# Patient Record
Sex: FEMALE | Race: WHITE | NOT HISPANIC OR LATINO | URBAN - METROPOLITAN AREA
[De-identification: names, ages, dates, MRNs, and addresses within clinical notes are randomized per-mention and may not be internally consistent; named-entity substitution may affect disease eponyms.]

---
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
## 2023-11-10 PROCEDURE — 87081 CULTURE SCREEN ONLY: CPT | Performed by: NURSE PRACTITIONER

## 2023-11-12 ENCOUNTER — TELEPHONE (OUTPATIENT)
Dept: URGENT CARE | Facility: CLINIC | Age: 18
End: 2023-11-12
Payer: COMMERCIAL

## 2023-11-14 ENCOUNTER — TELEPHONE (OUTPATIENT)
Dept: URGENT CARE | Facility: CLINIC | Age: 18
End: 2023-11-14

## 2023-11-15 ENCOUNTER — HOSPITAL ENCOUNTER (EMERGENCY)
Facility: HOSPITAL | Age: 18
Discharge: HOME | End: 2023-11-15
Attending: STUDENT IN AN ORGANIZED HEALTH CARE EDUCATION/TRAINING PROGRAM | Admitting: STUDENT IN AN ORGANIZED HEALTH CARE EDUCATION/TRAINING PROGRAM
Payer: COMMERCIAL

## 2023-11-15 ENCOUNTER — APPOINTMENT (OUTPATIENT)
Dept: CT IMAGING | Facility: HOSPITAL | Age: 18
End: 2023-11-15
Payer: COMMERCIAL

## 2023-11-15 VITALS
RESPIRATION RATE: 16 BRPM | OXYGEN SATURATION: 99 % | WEIGHT: 117 LBS | SYSTOLIC BLOOD PRESSURE: 126 MMHG | HEIGHT: 63 IN | DIASTOLIC BLOOD PRESSURE: 111 MMHG | HEART RATE: 118 BPM | TEMPERATURE: 98.1 F | BODY MASS INDEX: 20.73 KG/M2

## 2023-11-15 DIAGNOSIS — J36 PERITONSILLAR ABSCESS: Primary | ICD-10-CM

## 2023-11-15 LAB
ALBUMIN SERPL-MCNC: 4.7 G/DL (ref 3.5–5.2)
ALBUMIN/GLOB SERPL: 1.3 G/DL
ALP SERPL-CCNC: 80 U/L (ref 39–117)
ALT SERPL W P-5'-P-CCNC: 28 U/L (ref 1–33)
ANION GAP SERPL CALCULATED.3IONS-SCNC: 15 MMOL/L (ref 5–15)
AST SERPL-CCNC: 36 U/L (ref 1–32)
B-HCG UR QL: NEGATIVE
BASOPHILS # BLD AUTO: 0.07 10*3/MM3 (ref 0–0.2)
BASOPHILS NFR BLD AUTO: 0.4 % (ref 0–1.5)
BILIRUB SERPL-MCNC: 0.5 MG/DL (ref 0–1.2)
BUN SERPL-MCNC: 12 MG/DL (ref 6–20)
BUN/CREAT SERPL: 16 (ref 7–25)
CALCIUM SPEC-SCNC: 9.9 MG/DL (ref 8.6–10.5)
CHLORIDE SERPL-SCNC: 98 MMOL/L (ref 98–107)
CO2 SERPL-SCNC: 25 MMOL/L (ref 22–29)
CREAT SERPL-MCNC: 0.75 MG/DL (ref 0.57–1)
DEPRECATED RDW RBC AUTO: 42.3 FL (ref 37–54)
EGFRCR SERPLBLD CKD-EPI 2021: 117.1 ML/MIN/1.73
EOSINOPHIL # BLD AUTO: 0.01 10*3/MM3 (ref 0–0.4)
EOSINOPHIL NFR BLD AUTO: 0.1 % (ref 0.3–6.2)
ERYTHROCYTE [DISTWIDTH] IN BLOOD BY AUTOMATED COUNT: 11.9 % (ref 12.3–15.4)
EXPIRATION DATE: NORMAL
GLOBULIN UR ELPH-MCNC: 3.7 GM/DL
GLUCOSE SERPL-MCNC: 83 MG/DL (ref 65–99)
HCT VFR BLD AUTO: 47.6 % (ref 34–46.6)
HGB BLD-MCNC: 16 G/DL (ref 12–15.9)
IMM GRANULOCYTES # BLD AUTO: 0.27 10*3/MM3 (ref 0–0.05)
IMM GRANULOCYTES NFR BLD AUTO: 1.5 % (ref 0–0.5)
INTERNAL NEGATIVE CONTROL: NEGATIVE
INTERNAL POSITIVE CONTROL: POSITIVE
LYMPHOCYTES # BLD AUTO: 1.68 10*3/MM3 (ref 0.7–3.1)
LYMPHOCYTES NFR BLD AUTO: 9.4 % (ref 19.6–45.3)
Lab: NORMAL
MCH RBC QN AUTO: 32.5 PG (ref 26.6–33)
MCHC RBC AUTO-ENTMCNC: 33.6 G/DL (ref 31.5–35.7)
MCV RBC AUTO: 96.7 FL (ref 79–97)
MONOCYTES # BLD AUTO: 1.67 10*3/MM3 (ref 0.1–0.9)
MONOCYTES NFR BLD AUTO: 9.4 % (ref 5–12)
NEUTROPHILS NFR BLD AUTO: 14.09 10*3/MM3 (ref 1.7–7)
NEUTROPHILS NFR BLD AUTO: 79.2 % (ref 42.7–76)
NRBC BLD AUTO-RTO: 0 /100 WBC (ref 0–0.2)
PLATELET # BLD AUTO: 296 10*3/MM3 (ref 140–450)
PMV BLD AUTO: 9.8 FL (ref 6–12)
POTASSIUM SERPL-SCNC: 3.5 MMOL/L (ref 3.5–5.2)
PROT SERPL-MCNC: 8.4 G/DL (ref 6–8.5)
RBC # BLD AUTO: 4.92 10*6/MM3 (ref 3.77–5.28)
SODIUM SERPL-SCNC: 138 MMOL/L (ref 136–145)
WBC NRBC COR # BLD: 17.79 10*3/MM3 (ref 3.4–10.8)

## 2023-11-15 PROCEDURE — 99285 EMERGENCY DEPT VISIT HI MDM: CPT

## 2023-11-15 PROCEDURE — 81025 URINE PREGNANCY TEST: CPT | Performed by: PHYSICIAN ASSISTANT

## 2023-11-15 PROCEDURE — 25510000001 IOPAMIDOL 61 % SOLUTION: Performed by: STUDENT IN AN ORGANIZED HEALTH CARE EDUCATION/TRAINING PROGRAM

## 2023-11-15 PROCEDURE — 25010000002 DEXAMETHASONE SODIUM PHOSPHATE 100 MG/10ML SOLUTION: Performed by: PHYSICIAN ASSISTANT

## 2023-11-15 PROCEDURE — 25010000002 AMPICILLIN-SULBACTAM PER 1.5 G: Performed by: PHYSICIAN ASSISTANT

## 2023-11-15 PROCEDURE — 96375 TX/PRO/DX INJ NEW DRUG ADDON: CPT

## 2023-11-15 PROCEDURE — 70491 CT SOFT TISSUE NECK W/DYE: CPT

## 2023-11-15 PROCEDURE — 25010000002 KETOROLAC TROMETHAMINE PER 15 MG: Performed by: PHYSICIAN ASSISTANT

## 2023-11-15 PROCEDURE — 96365 THER/PROPH/DIAG IV INF INIT: CPT

## 2023-11-15 PROCEDURE — 85025 COMPLETE CBC W/AUTO DIFF WBC: CPT | Performed by: PHYSICIAN ASSISTANT

## 2023-11-15 PROCEDURE — 25810000003 SODIUM CHLORIDE 0.9 % SOLUTION: Performed by: PHYSICIAN ASSISTANT

## 2023-11-15 PROCEDURE — 96367 TX/PROPH/DG ADDL SEQ IV INF: CPT

## 2023-11-15 PROCEDURE — 80053 COMPREHEN METABOLIC PANEL: CPT | Performed by: PHYSICIAN ASSISTANT

## 2023-11-15 PROCEDURE — 96366 THER/PROPH/DIAG IV INF ADDON: CPT

## 2023-11-15 RX ORDER — KETOROLAC TROMETHAMINE 30 MG/ML
15 INJECTION, SOLUTION INTRAMUSCULAR; INTRAVENOUS ONCE
Status: COMPLETED | OUTPATIENT
Start: 2023-11-15 | End: 2023-11-15

## 2023-11-15 RX ORDER — SODIUM CHLORIDE 0.9 % (FLUSH) 0.9 %
10 SYRINGE (ML) INJECTION AS NEEDED
Status: DISCONTINUED | OUTPATIENT
Start: 2023-11-15 | End: 2023-11-15 | Stop reason: HOSPADM

## 2023-11-15 RX ADMIN — DEXAMETHASONE SODIUM PHOSPHATE 10 MG: 10 INJECTION, SOLUTION INTRAMUSCULAR; INTRAVENOUS at 11:42

## 2023-11-15 RX ADMIN — KETOROLAC TROMETHAMINE 15 MG: 30 INJECTION, SOLUTION INTRAMUSCULAR; INTRAVENOUS at 11:42

## 2023-11-15 RX ADMIN — SODIUM CHLORIDE 1000 ML: 9 INJECTION, SOLUTION INTRAVENOUS at 11:42

## 2023-11-15 RX ADMIN — IOPAMIDOL 74 ML: 612 INJECTION, SOLUTION INTRAVENOUS at 11:53

## 2023-11-15 RX ADMIN — SODIUM CHLORIDE 1.5 G: 900 INJECTION INTRAVENOUS at 13:13

## 2023-12-06 ENCOUNTER — OFFICE VISIT (OUTPATIENT)
Dept: FAMILY MEDICINE CLINIC | Facility: CLINIC | Age: 18
End: 2023-12-06
Payer: COMMERCIAL

## 2023-12-06 VITALS
SYSTOLIC BLOOD PRESSURE: 141 MMHG | OXYGEN SATURATION: 100 % | DIASTOLIC BLOOD PRESSURE: 88 MMHG | HEIGHT: 63 IN | BODY MASS INDEX: 21.9 KG/M2 | WEIGHT: 123.6 LBS | HEART RATE: 110 BPM | TEMPERATURE: 98.2 F

## 2023-12-06 DIAGNOSIS — Z79.899 ENCOUNTER FOR LONG-TERM (CURRENT) USE OF HIGH-RISK MEDICATION: ICD-10-CM

## 2023-12-06 DIAGNOSIS — F98.8 ATTENTION DEFICIT DISORDER (ADD) WITHOUT HYPERACTIVITY: Primary | ICD-10-CM

## 2023-12-06 PROCEDURE — 80305 DRUG TEST PRSMV DIR OPT OBS: CPT | Performed by: NURSE PRACTITIONER

## 2023-12-06 PROCEDURE — 99203 OFFICE O/P NEW LOW 30 MIN: CPT | Performed by: NURSE PRACTITIONER

## 2023-12-06 RX ORDER — METHYLPHENIDATE HYDROCHLORIDE 54 MG/1
54 TABLET ORAL EVERY MORNING
Qty: 30 TABLET | Refills: 0 | Status: SHIPPED | OUTPATIENT
Start: 2023-12-06

## 2023-12-06 RX ORDER — AMOXICILLIN AND CLAVULANATE POTASSIUM 600; 42.9 MG/5ML; MG/5ML
POWDER, FOR SUSPENSION ORAL
COMMUNITY

## 2024-01-08 RX ORDER — METHYLPHENIDATE HYDROCHLORIDE 54 MG/1
54 TABLET ORAL EVERY MORNING
Qty: 30 TABLET | Refills: 0 | Status: SHIPPED | OUTPATIENT
Start: 2024-01-08

## 2024-02-07 RX ORDER — METHYLPHENIDATE HYDROCHLORIDE 54 MG/1
54 TABLET ORAL EVERY MORNING
Qty: 30 TABLET | Refills: 0 | Status: SHIPPED | OUTPATIENT
Start: 2024-02-07

## 2024-03-06 ENCOUNTER — OFFICE VISIT (OUTPATIENT)
Dept: FAMILY MEDICINE CLINIC | Facility: CLINIC | Age: 19
End: 2024-03-06
Payer: COMMERCIAL

## 2024-03-06 VITALS
WEIGHT: 123 LBS | OXYGEN SATURATION: 100 % | TEMPERATURE: 98.6 F | HEIGHT: 63 IN | BODY MASS INDEX: 21.79 KG/M2 | SYSTOLIC BLOOD PRESSURE: 122 MMHG | HEART RATE: 99 BPM | DIASTOLIC BLOOD PRESSURE: 69 MMHG

## 2024-03-06 DIAGNOSIS — F98.8 ATTENTION DEFICIT DISORDER (ADD) WITHOUT HYPERACTIVITY: Primary | ICD-10-CM

## 2024-03-06 DIAGNOSIS — J02.8 PHARYNGITIS DUE TO OTHER ORGANISM: ICD-10-CM

## 2024-03-06 PROCEDURE — 99214 OFFICE O/P EST MOD 30 MIN: CPT | Performed by: NURSE PRACTITIONER

## 2024-03-06 RX ORDER — AMOXICILLIN AND CLAVULANATE POTASSIUM 875; 125 MG/1; MG/1
1 TABLET, FILM COATED ORAL 2 TIMES DAILY
Qty: 20 TABLET | Refills: 0 | Status: SHIPPED | OUTPATIENT
Start: 2024-03-06 | End: 2024-03-16

## 2024-03-06 RX ORDER — METHYLPHENIDATE HYDROCHLORIDE 54 MG/1
54 TABLET ORAL EVERY MORNING
Qty: 30 TABLET | Refills: 0 | Status: SHIPPED | OUTPATIENT
Start: 2024-03-06

## 2024-04-06 DIAGNOSIS — F98.8 ATTENTION DEFICIT DISORDER (ADD) WITHOUT HYPERACTIVITY: ICD-10-CM

## 2024-04-08 RX ORDER — METHYLPHENIDATE HYDROCHLORIDE 54 MG/1
54 TABLET ORAL EVERY MORNING
Qty: 30 TABLET | Refills: 0 | Status: SHIPPED | OUTPATIENT
Start: 2024-04-08

## 2024-05-06 DIAGNOSIS — F98.8 ATTENTION DEFICIT DISORDER (ADD) WITHOUT HYPERACTIVITY: ICD-10-CM

## 2024-05-06 RX ORDER — METHYLPHENIDATE HYDROCHLORIDE 54 MG/1
54 TABLET ORAL EVERY MORNING
Qty: 30 TABLET | Refills: 0 | Status: SHIPPED | OUTPATIENT
Start: 2024-05-06

## 2024-06-04 DIAGNOSIS — F98.8 ATTENTION DEFICIT DISORDER (ADD) WITHOUT HYPERACTIVITY: ICD-10-CM

## 2024-06-04 RX ORDER — METHYLPHENIDATE HYDROCHLORIDE 54 MG/1
54 TABLET ORAL EVERY MORNING
Qty: 30 TABLET | Refills: 0 | Status: SHIPPED | OUTPATIENT
Start: 2024-06-04

## 2024-06-10 ENCOUNTER — OFFICE VISIT (OUTPATIENT)
Dept: FAMILY MEDICINE CLINIC | Facility: CLINIC | Age: 19
End: 2024-06-10
Payer: COMMERCIAL

## 2024-06-10 VITALS
DIASTOLIC BLOOD PRESSURE: 71 MMHG | WEIGHT: 115 LBS | HEART RATE: 101 BPM | HEIGHT: 63 IN | BODY MASS INDEX: 20.38 KG/M2 | TEMPERATURE: 98.7 F | SYSTOLIC BLOOD PRESSURE: 108 MMHG | OXYGEN SATURATION: 100 %

## 2024-06-10 DIAGNOSIS — F98.8 ATTENTION DEFICIT DISORDER (ADD) WITHOUT HYPERACTIVITY: Primary | ICD-10-CM

## 2024-06-10 DIAGNOSIS — B35.4 RINGWORM OF BODY: ICD-10-CM

## 2024-06-10 PROCEDURE — 99213 OFFICE O/P EST LOW 20 MIN: CPT | Performed by: NURSE PRACTITIONER

## 2024-06-10 RX ORDER — CLOTRIMAZOLE 1 %
1 CREAM (GRAM) TOPICAL 2 TIMES DAILY
Qty: 60 G | Refills: 0 | Status: SHIPPED | OUTPATIENT
Start: 2024-06-10

## 2024-07-07 DIAGNOSIS — F98.8 ATTENTION DEFICIT DISORDER (ADD) WITHOUT HYPERACTIVITY: ICD-10-CM

## 2024-07-08 RX ORDER — METHYLPHENIDATE HYDROCHLORIDE 54 MG/1
54 TABLET ORAL EVERY MORNING
Qty: 30 TABLET | Refills: 0 | Status: SHIPPED | OUTPATIENT
Start: 2024-07-08

## 2024-08-06 DIAGNOSIS — F98.8 ATTENTION DEFICIT DISORDER (ADD) WITHOUT HYPERACTIVITY: ICD-10-CM

## 2024-08-06 RX ORDER — METHYLPHENIDATE HYDROCHLORIDE 54 MG/1
54 TABLET ORAL EVERY MORNING
Qty: 30 TABLET | Refills: 0 | Status: SHIPPED | OUTPATIENT
Start: 2024-08-06

## 2024-09-10 ENCOUNTER — OFFICE VISIT (OUTPATIENT)
Dept: FAMILY MEDICINE CLINIC | Facility: CLINIC | Age: 19
End: 2024-09-10
Payer: COMMERCIAL

## 2024-09-10 VITALS
WEIGHT: 114 LBS | OXYGEN SATURATION: 100 % | BODY MASS INDEX: 20.2 KG/M2 | SYSTOLIC BLOOD PRESSURE: 102 MMHG | HEIGHT: 63 IN | HEART RATE: 127 BPM | DIASTOLIC BLOOD PRESSURE: 68 MMHG

## 2024-09-10 DIAGNOSIS — F98.8 ATTENTION DEFICIT DISORDER (ADD) WITHOUT HYPERACTIVITY: Primary | ICD-10-CM

## 2024-09-10 DIAGNOSIS — Z79.899 ENCOUNTER FOR LONG-TERM (CURRENT) USE OF HIGH-RISK MEDICATION: ICD-10-CM

## 2024-09-10 DIAGNOSIS — F98.8 ATTENTION DEFICIT DISORDER (ADD) WITHOUT HYPERACTIVITY: ICD-10-CM

## 2024-09-10 LAB
AMPHET+METHAMPHET UR QL: NEGATIVE
AMPHETAMINE INTERNAL CONTROL: NORMAL
AMPHETAMINES UR QL: NEGATIVE
BARBITURATE INTERNAL CONTROL: NORMAL
BARBITURATES UR QL SCN: NEGATIVE
BENZODIAZ UR QL SCN: NEGATIVE
BENZODIAZEPINE INTERNAL CONTROL: NORMAL
BUPRENORPHINE INTERNAL CONTROL: NORMAL
BUPRENORPHINE SERPL-MCNC: NEGATIVE NG/ML
CANNABINOIDS SERPL QL: NEGATIVE
COCAINE INTERNAL CONTROL: NORMAL
COCAINE UR QL: NEGATIVE
EXPIRATION DATE: NORMAL
Lab: NORMAL
MDMA (ECSTASY) INTERNAL CONTROL: NORMAL
MDMA UR QL SCN: NEGATIVE
METHADONE INTERNAL CONTROL: NORMAL
METHADONE UR QL SCN: NEGATIVE
METHAMPHETAMINE INTERNAL CONTROL: NORMAL
MORPHINE INTERNAL CONTROL: NORMAL
MORPHINE/OPIATES SCREEN, URINE: NEGATIVE
OXYCODONE INTERNAL CONTROL: NORMAL
OXYCODONE UR QL SCN: NEGATIVE
PCP UR QL SCN: NEGATIVE
PHENCYCLIDINE INTERNAL CONTROL: NORMAL
THC INTERNAL CONTROL: NORMAL

## 2024-09-10 PROCEDURE — 80305 DRUG TEST PRSMV DIR OPT OBS: CPT | Performed by: NURSE PRACTITIONER

## 2024-09-10 PROCEDURE — 99213 OFFICE O/P EST LOW 20 MIN: CPT | Performed by: NURSE PRACTITIONER

## 2024-09-10 RX ORDER — METHYLPHENIDATE HYDROCHLORIDE 54 MG/1
54 TABLET ORAL EVERY MORNING
Qty: 30 TABLET | Refills: 0 | Status: SHIPPED | OUTPATIENT
Start: 2024-09-10

## 2024-10-08 DIAGNOSIS — F98.8 ATTENTION DEFICIT DISORDER (ADD) WITHOUT HYPERACTIVITY: ICD-10-CM

## 2024-10-08 RX ORDER — METHYLPHENIDATE HYDROCHLORIDE 54 MG/1
54 TABLET ORAL EVERY MORNING
Qty: 30 TABLET | Refills: 0 | Status: SHIPPED | OUTPATIENT
Start: 2024-10-08

## 2024-11-07 DIAGNOSIS — F98.8 ATTENTION DEFICIT DISORDER (ADD) WITHOUT HYPERACTIVITY: ICD-10-CM

## 2024-11-08 RX ORDER — METHYLPHENIDATE HYDROCHLORIDE 54 MG/1
54 TABLET ORAL EVERY MORNING
Qty: 30 TABLET | Refills: 0 | Status: SHIPPED | OUTPATIENT
Start: 2024-11-08

## 2024-12-09 NOTE — PROGRESS NOTES
Chief Complaint  ADD    Subjective          Chas Laguerre is a 19 y.o. female who presents to [facility]    History of Present Illness    ADD: feeling good on Concerta 54 mg, concentration and focus is improved.  Able to finish tasks now.    PHQ-2 Total Score:     PHQ-9 Total Score:         Review of Systems   Constitutional:  Negative for chills, diaphoresis, fatigue and fever.   HENT:  Negative for congestion and sore throat.    Respiratory:  Negative for cough.    Cardiovascular:  Negative for chest pain.   Gastrointestinal:  Negative for abdominal pain, nausea and vomiting.   Genitourinary:  Negative for dysuria.   Musculoskeletal:  Negative for myalgias and neck pain.   Skin:  Negative for rash.   Neurological:  Negative for weakness, numbness and headaches.          Medical History: has a past medical history of ADHD and Seizures.     Surgical History: has a past surgical history that includes Foot surgery (Right).     Family History: family history is not on file.     Social History: reports that she has never smoked. She has never used smokeless tobacco. She reports that she does not drink alcohol and does not use drugs.    Allergies: Patient has no known allergies.      Health Maintenance Due   Topic Date Due    ANNUAL PHYSICAL  Never done    PAP SMEAR  Never done    INFLUENZA VACCINE  07/01/2024    COVID-19 Vaccine (4 - 2024-25 season) 09/01/2024            Current Outpatient Medications:     methylphenidate (Concerta) 54 MG CR tablet, Take 1 tablet by mouth Every Morning, Disp: 30 tablet, Rfl: 0      Immunization History   Administered Date(s) Administered    31-influenza Vac Quardvalent Preservativ 11/04/2014, 10/16/2015    COVID-19 (PFIZER) Purple Cap Monovalent 07/27/2021, 08/17/2021    Covid-19 (Pfizer) Gray Cap Monovalent 07/19/2022    DTaP 2003, 01/30/2004, 04/23/2004, 02/18/2005, 08/23/2007    Fluzone (or Fluarix & Flulaval for VFC) >6mos 01/31/2022    Hep  "A, 2 Dose 12/17/2018, 06/24/2019    Hep B / HiB 2003, 08/23/2004    Hep B, Adolescent or Pediatric 2003, 2003, 08/23/2004    Hib (PRP-OMP) 2003, 2003, 01/30/2004, 08/23/2004    Hpv9 12/17/2018, 06/24/2019, 01/08/2020    IPV 2003, 01/30/2004, 08/23/2004, 08/23/2007    Influenza Seasonal Injectable 11/06/2012, 10/07/2013    Influenza, Unspecified 02/01/2023    MMR 02/18/2005, 08/23/2007    Meningococcal MCV4P (Menactra) 11/04/2014, 01/08/2020    PEDS-Pneumococcal Conjugate (PCV7) 2003, 01/30/2004, 04/23/2004, 02/18/2005    Td (TDVAX) 08/23/2004    Tdap 11/04/2014    Varicella 08/23/2004, 11/04/2014         Objective       Vitals:    12/10/24 0918   BP: 106/75   Pulse: 103   SpO2: 99%   Weight: 50.5 kg (111 lb 6.4 oz)   Height: 160 cm (63\")      Body mass index is 19.73 kg/m².   Wt Readings from Last 3 Encounters:   12/10/24 50.5 kg (111 lb 6.4 oz)   09/10/24 51.7 kg (114 lb)   06/10/24 52.2 kg (115 lb)      BP Readings from Last 3 Encounters:   12/10/24 106/75   09/10/24 102/68   06/10/24 108/71        BMI is within normal parameters. No other follow-up for BMI required.       Physical Exam  Vitals reviewed.   Constitutional:       Appearance: Normal appearance.   HENT:      Head: Normocephalic and atraumatic.   Skin:     General: Skin is warm and dry.   Neurological:      Mental Status: She is alert and oriented to person, place, and time.   Psychiatric:         Mood and Affect: Mood normal.         Behavior: Behavior normal.         Thought Content: Thought content normal.         Judgment: Judgment normal.             Result Review :                          Assessment and Plan        Diagnoses and all orders for this visit:    1. Attention deficit disorder (ADD) without hyperactivity (Primary)  Comments:  Doing well on Concerta 54 mg.    2. Encounter for long-term (current) use of high-risk medication  Comments:  JAYLIN reviewed and UDS up to date    3. Need for influenza " vaccination  -     Fluzone >6mos ([id number])    4. Need for COVID-19 vaccine  -     COVID-19 (Pfizer) 12yrs+ (COMIRNATY)          Follow Up     Return in about 3 months (around 3/10/2025).      Obtained a written consent for JAYLIN query. Discussed the risks and benefits of the use of controlled substances with the patient, including the risks of tolerance and drug dependence.  The patient has been counseled on the need to have an exit strategy, including potentially discontinuing the use of controlled substances.  JAYLIN has or will be reviewed as soon as it becomes available.    Patient was given instructions and counseling regarding her condition or for health maintenance advice. Please see specific information pulled into the AVS if appropriate. Patient understands the importance of having any ordered tests to be performed in a timely fashion.      I spent 5 minutes caring for Chas on this date of service. This time includes time spent by me in the following activities: preparing for the visit, reviewing tests, performing a medically appropriate examination and/or evaluation, counseling and educating the patient/family/caregiver, referring and communicating with other health care professionals, documenting information in the medical record, independently interpreting results and communicating that information with the patient/family/caregiver, care coordination, ordering medications, and ordering test(s)      ANTONIETA Marquez    Answers submitted by the patient for this visit:  Primary Reason for Visit (Submitted on 12/4/2024)  What is the primary reason for your visit?: Problem Not Listed

## 2024-12-10 ENCOUNTER — OFFICE VISIT (OUTPATIENT)
Dept: FAMILY MEDICINE CLINIC | Facility: CLINIC | Age: 19
End: 2024-12-10
Payer: COMMERCIAL

## 2024-12-10 VITALS
HEART RATE: 103 BPM | HEIGHT: 63 IN | SYSTOLIC BLOOD PRESSURE: 106 MMHG | BODY MASS INDEX: 19.74 KG/M2 | WEIGHT: 111.4 LBS | OXYGEN SATURATION: 99 % | DIASTOLIC BLOOD PRESSURE: 75 MMHG

## 2024-12-10 DIAGNOSIS — F98.8 ATTENTION DEFICIT DISORDER (ADD) WITHOUT HYPERACTIVITY: Primary | ICD-10-CM

## 2024-12-10 DIAGNOSIS — Z23 NEED FOR INFLUENZA VACCINATION: ICD-10-CM

## 2024-12-10 DIAGNOSIS — Z79.899 ENCOUNTER FOR LONG-TERM (CURRENT) USE OF HIGH-RISK MEDICATION: ICD-10-CM

## 2024-12-10 DIAGNOSIS — F98.8 ATTENTION DEFICIT DISORDER (ADD) WITHOUT HYPERACTIVITY: ICD-10-CM

## 2024-12-10 DIAGNOSIS — Z23 NEED FOR COVID-19 VACCINE: ICD-10-CM

## 2024-12-10 PROCEDURE — 90656 IIV3 VACC NO PRSV 0.5 ML IM: CPT | Performed by: NURSE PRACTITIONER

## 2024-12-10 PROCEDURE — 90480 ADMN SARSCOV2 VAC 1/ONLY CMP: CPT | Performed by: NURSE PRACTITIONER

## 2024-12-10 PROCEDURE — 90471 IMMUNIZATION ADMIN: CPT | Performed by: NURSE PRACTITIONER

## 2024-12-10 PROCEDURE — 91320 SARSCV2 VAC 30MCG TRS-SUC IM: CPT | Performed by: NURSE PRACTITIONER

## 2024-12-10 PROCEDURE — 99214 OFFICE O/P EST MOD 30 MIN: CPT | Performed by: NURSE PRACTITIONER

## 2024-12-10 RX ORDER — METHYLPHENIDATE HYDROCHLORIDE 54 MG/1
54 TABLET ORAL EVERY MORNING
Qty: 90 TABLET | Refills: 0 | Status: CANCELLED | OUTPATIENT
Start: 2024-12-10

## 2024-12-10 RX ORDER — METHYLPHENIDATE HYDROCHLORIDE 54 MG/1
54 TABLET ORAL EVERY MORNING
Qty: 30 TABLET | Refills: 0 | Status: SHIPPED | OUTPATIENT
Start: 2024-12-10

## 2025-01-07 DIAGNOSIS — F98.8 ATTENTION DEFICIT DISORDER (ADD) WITHOUT HYPERACTIVITY: ICD-10-CM

## 2025-01-07 RX ORDER — METHYLPHENIDATE HYDROCHLORIDE 54 MG/1
54 TABLET ORAL EVERY MORNING
Qty: 30 TABLET | Refills: 0 | Status: SHIPPED | OUTPATIENT
Start: 2025-01-07

## 2025-02-09 DIAGNOSIS — F98.8 ATTENTION DEFICIT DISORDER (ADD) WITHOUT HYPERACTIVITY: ICD-10-CM

## 2025-02-10 RX ORDER — METHYLPHENIDATE HYDROCHLORIDE 54 MG/1
54 TABLET ORAL EVERY MORNING
Qty: 30 TABLET | Refills: 0 | Status: SHIPPED | OUTPATIENT
Start: 2025-02-10

## 2025-03-10 ENCOUNTER — OFFICE VISIT (OUTPATIENT)
Dept: FAMILY MEDICINE CLINIC | Facility: CLINIC | Age: 20
End: 2025-03-10
Payer: COMMERCIAL

## 2025-03-10 VITALS
HEART RATE: 118 BPM | WEIGHT: 113 LBS | OXYGEN SATURATION: 99 % | DIASTOLIC BLOOD PRESSURE: 81 MMHG | HEIGHT: 63 IN | BODY MASS INDEX: 20.02 KG/M2 | SYSTOLIC BLOOD PRESSURE: 114 MMHG

## 2025-03-10 DIAGNOSIS — Z79.899 ENCOUNTER FOR LONG-TERM (CURRENT) USE OF HIGH-RISK MEDICATION: ICD-10-CM

## 2025-03-10 DIAGNOSIS — G47.00 INSOMNIA, UNSPECIFIED TYPE: ICD-10-CM

## 2025-03-10 DIAGNOSIS — F98.8 ATTENTION DEFICIT DISORDER (ADD) WITHOUT HYPERACTIVITY: ICD-10-CM

## 2025-03-10 DIAGNOSIS — F98.8 ATTENTION DEFICIT DISORDER (ADD) WITHOUT HYPERACTIVITY: Primary | ICD-10-CM

## 2025-03-10 PROCEDURE — 80305 DRUG TEST PRSMV DIR OPT OBS: CPT | Performed by: NURSE PRACTITIONER

## 2025-03-10 PROCEDURE — 99214 OFFICE O/P EST MOD 30 MIN: CPT | Performed by: NURSE PRACTITIONER

## 2025-03-10 RX ORDER — METHYLPHENIDATE HYDROCHLORIDE 54 MG/1
54 TABLET ORAL EVERY MORNING
Qty: 30 TABLET | Refills: 0 | Status: SHIPPED | OUTPATIENT
Start: 2025-03-10

## 2025-03-10 NOTE — PROGRESS NOTES
Chief Complaint  ADD (Follow up//) and Insomnia (/)    Subjective          Chas Laguerre is a 19 y.o. female who presents to [facility]    History of Present Illness  Over the past couple of weeks having difficulty falling asleep and maintaining sleep. States even slight noises will wake her from sleeping and then has difficulty falling back asleep again.     Medication check up with new onset of insomnia  Symptoms are: new.   Onset was 1 to 4 weeks.   Symptoms occur: intermittently.  Pertinent negative symptoms include no abdominal pain, no anorexia, no joint pain, no change in stool, no chest pain, no chills, no congestion, no cough, no diaphoresis, no fatigue, no fever, no headaches, no joint swelling, no myalgias, no nausea, no neck pain, no numbness, no rash, no sore throat, no swollen glands, no dysuria, no vertigo, no visual change, no vomiting and no weakness.   Treatments tried include nothing.   Additional information: Has not attempted any medications/remedies for insomnia due to a history of seizure activity when concurrently taking melatonin. HCP did not find true correlation with melatonin and seizure, but wanted to make sure it was okay to take with current medication regimen prior to taking.         PHQ-2 Total Score: 0   PHQ-9 Total Score:         Review of Systems   Constitutional:  Negative for chills, diaphoresis, fatigue and fever.   HENT:  Negative for congestion and sore throat.    Respiratory:  Negative for cough.    Cardiovascular:  Negative for chest pain and palpitations.   Gastrointestinal:  Negative for abdominal pain, anorexia, nausea and vomiting.   Genitourinary:  Negative for dysuria.   Musculoskeletal:  Negative for joint pain, myalgias and neck pain.   Skin:  Negative for rash.   Neurological:  Negative for vertigo, weakness, numbness and headaches.   Psychiatric/Behavioral:  Positive for sleep disturbance. Negative for decreased concentration.  The patient is not nervous/anxious and is not hyperactive.           Medical History: has a past medical history of ADHD and Seizures.     Surgical History: has a past surgical history that includes Foot surgery (Right).     Family History: family history is not on file.     Social History: reports that she has never smoked. She has never used smokeless tobacco. She reports that she does not drink alcohol and does not use drugs.    Allergies: Patient has no known allergies.      Health Maintenance Due   Topic Date Due    ANNUAL PHYSICAL  Never done    PAP SMEAR  Never done            Current Outpatient Medications:     methylphenidate (Concerta) 54 MG CR tablet, Take 1 tablet by mouth Every Morning, Disp: 30 tablet, Rfl: 0      Immunization History   Administered Date(s) Administered    31-influenza Vac Quardvalent Preservativ 11/04/2014, 10/16/2015    COVID-19 (PFIZER) 12YRS+ (COMIRNATY) 12/10/2024    COVID-19 (PFIZER) Purple Cap Monovalent 07/27/2021, 08/17/2021    Covid-19 (Pfizer) Gray Cap Monovalent 07/19/2022    DTaP 2003, 01/30/2004, 04/23/2004, 02/18/2005, 08/23/2007    Fluzone  >6mos 12/10/2024    Fluzone (or Fluarix & Flulaval for VFC) >6mos 01/31/2022    Hep A, 2 Dose 12/17/2018, 06/24/2019    Hep B / HiB 2003, 08/23/2004    Hep B, Adolescent or Pediatric 2003, 2003, 08/23/2004    Hib (PRP-OMP) 2003, 2003, 01/30/2004, 08/23/2004    Hpv9 12/17/2018, 06/24/2019, 01/08/2020    IPV 2003, 01/30/2004, 08/23/2004, 08/23/2007    Influenza Seasonal Injectable 11/06/2012, 10/07/2013    Influenza, Unspecified 02/01/2023    MMR 02/18/2005, 08/23/2007    Meningococcal MCV4P (Menactra) 11/04/2014, 01/08/2020    PEDS-Pneumococcal Conjugate (PCV7) 2003, 01/30/2004, 04/23/2004, 02/18/2005    Td (TDVAX) 08/23/2004    Tdap 11/04/2014    Varicella 08/23/2004, 11/04/2014         Objective       Vitals:    03/10/25 0803   BP: 114/81   Pulse: 118   SpO2: 99%   Weight: 51.3 kg (113  "lb)   Height: 160 cm (63\")      Body mass index is 20.02 kg/m².   Wt Readings from Last 3 Encounters:   03/10/25 51.3 kg (113 lb)   12/10/24 50.5 kg (111 lb 6.4 oz)   09/10/24 51.7 kg (114 lb)      BP Readings from Last 3 Encounters:   03/10/25 114/81   12/10/24 106/75   09/10/24 102/68        BMI is within normal parameters. No other follow-up for BMI required.       Physical Exam  Vitals and nursing note reviewed.   Constitutional:       Appearance: Normal appearance. She is normal weight.   HENT:      Head: Normocephalic.      Nose: Nose normal.      Mouth/Throat:      Mouth: Mucous membranes are moist.      Pharynx: Oropharynx is clear.   Cardiovascular:      Rate and Rhythm: Normal rate and regular rhythm.      Pulses: Normal pulses.   Pulmonary:      Effort: Pulmonary effort is normal.      Breath sounds: Normal breath sounds.   Abdominal:      General: Abdomen is flat. Bowel sounds are normal.      Palpations: Abdomen is soft.   Neurological:      General: No focal deficit present.      Mental Status: She is alert and oriented to person, place, and time. Mental status is at baseline.   Psychiatric:         Mood and Affect: Mood normal.         Speech: Speech normal.         Behavior: Behavior normal. Behavior is cooperative.         Thought Content: Thought content normal.         Cognition and Memory: Cognition normal.         Judgment: Judgment normal.             Result Review :                          Assessment and Plan        Diagnoses and all orders for this visit:    1. Attention deficit disorder (ADD) without hyperactivity (Primary)  Comments:  Doing well with no difficulty with concentration. Continuing Concerta  Orders:  -     POC 12 Panel Urine Drug Screen    2. Insomnia, unspecified type  Comments:  New onset of difficulty falling asleep and staying asleep. Even slight noises awaken from sleep.   Discussed melatonin and magnesium over-the-counter.    3. Encounter for long-term (current) use of " high-risk medication  Comments:  KASPAR review. UDS obtained today.  Orders:  -     POC 12 Panel Urine Drug Screen          Follow Up     Return in about 3 months (around 6/10/2025) for Recheck.    Patient was given instructions and counseling regarding her condition or for health maintenance advice. Please see specific information pulled into the AVS if appropriate. Patient understands the importance of having any ordered tests to be performed in a timely fashion.      I spent 15 minutes caring for Chas on this date of service. This time includes time spent by me in the following activities: preparing for the visit, reviewing tests, performing a medically appropriate examination and/or evaluation, counseling and educating the patient/family/caregiver, referring and communicating with other health care professionals, documenting information in the medical record, independently interpreting results and communicating that information with the patient/family/caregiver, care coordination, ordering medications, and ordering test(s)      Patricia Mcpherson, ANTONIETA

## 2025-03-12 DIAGNOSIS — F98.8 ATTENTION DEFICIT DISORDER (ADD) WITHOUT HYPERACTIVITY: ICD-10-CM

## 2025-03-12 RX ORDER — METHYLPHENIDATE HYDROCHLORIDE 54 MG/1
54 TABLET ORAL EVERY MORNING
Qty: 30 TABLET | Refills: 0 | OUTPATIENT
Start: 2025-03-12

## 2025-04-14 DIAGNOSIS — F98.8 ATTENTION DEFICIT DISORDER (ADD) WITHOUT HYPERACTIVITY: ICD-10-CM

## 2025-04-14 RX ORDER — METHYLPHENIDATE HYDROCHLORIDE 54 MG/1
54 TABLET ORAL EVERY MORNING
Qty: 30 TABLET | Refills: 0 | Status: SHIPPED | OUTPATIENT
Start: 2025-04-14

## 2025-05-13 DIAGNOSIS — F98.8 ATTENTION DEFICIT DISORDER (ADD) WITHOUT HYPERACTIVITY: ICD-10-CM

## 2025-05-14 DIAGNOSIS — F98.8 ATTENTION DEFICIT DISORDER (ADD) WITHOUT HYPERACTIVITY: Primary | ICD-10-CM

## 2025-05-14 RX ORDER — METHYLPHENIDATE HYDROCHLORIDE 54 MG/1
54 TABLET ORAL EVERY MORNING
Qty: 30 TABLET | Refills: 0 | Status: SHIPPED | OUTPATIENT
Start: 2025-05-14

## 2025-05-14 RX ORDER — METHYLPHENIDATE HYDROCHLORIDE 54 MG/1
54 TABLET ORAL EVERY MORNING
Qty: 30 TABLET | Refills: 0 | OUTPATIENT
Start: 2025-05-14

## 2025-05-14 NOTE — TELEPHONE ENCOUNTER
Patient called unsure why she received a call from psychiatry for an appointment when she has not established care with [facility] yet and has not been notified of any of this. I notified the patient that since Patricia Ky is no longer at our office and none of the other providers in our office will be prescribing long term controlled substances we referred her to behavioral health for further management/refills. Patient stated she was unaware of any of this however she would think about it and call back to cancel her appointment with [facility] if she can find someone closer to her that can refill her medication.

## 2025-06-10 ENCOUNTER — OFFICE VISIT (OUTPATIENT)
Dept: FAMILY MEDICINE CLINIC | Facility: CLINIC | Age: 20
End: 2025-06-10
Payer: COMMERCIAL

## 2025-06-10 VITALS
WEIGHT: 113 LBS | BODY MASS INDEX: 20.02 KG/M2 | OXYGEN SATURATION: 98 % | TEMPERATURE: 98 F | HEART RATE: 111 BPM | DIASTOLIC BLOOD PRESSURE: 82 MMHG | HEIGHT: 63 IN | SYSTOLIC BLOOD PRESSURE: 120 MMHG

## 2025-06-10 DIAGNOSIS — Z79.899 ENCOUNTER FOR LONG-TERM (CURRENT) USE OF HIGH-RISK MEDICATION: ICD-10-CM

## 2025-06-10 DIAGNOSIS — F98.8 ATTENTION DEFICIT DISORDER (ADD) WITHOUT HYPERACTIVITY: Primary | ICD-10-CM

## 2025-06-10 PROCEDURE — 80305 DRUG TEST PRSMV DIR OPT OBS: CPT | Performed by: FAMILY MEDICINE

## 2025-06-10 PROCEDURE — 99213 OFFICE O/P EST LOW 20 MIN: CPT | Performed by: FAMILY MEDICINE

## 2025-06-10 RX ORDER — METHYLPHENIDATE HYDROCHLORIDE 54 MG/1
54 TABLET ORAL EVERY MORNING
Qty: 30 TABLET | Refills: 0 | Status: SHIPPED | OUTPATIENT
Start: 2025-06-10

## 2025-06-10 RX ORDER — HYDROCODONE BITARTRATE AND ACETAMINOPHEN 5; 325 MG/1; MG/1
1 TABLET ORAL
COMMUNITY
Start: 2025-03-13 | End: 2025-06-10

## 2025-06-10 RX ORDER — IBUPROFEN 600 MG/1
1 TABLET, FILM COATED ORAL
COMMUNITY
Start: 2025-03-13 | End: 2025-06-10

## 2025-06-10 RX ORDER — ALPRAZOLAM 1 MG/1
TABLET ORAL
COMMUNITY
Start: 2025-03-13 | End: 2025-06-10

## 2025-06-10 NOTE — ASSESSMENT & PLAN NOTE
She is and has been doing well with her current medication and dosage.  Will update her UDS as well as her Rigo.

## 2025-06-10 NOTE — PROGRESS NOTES
Chief Complaint   Patient presents with    ADD     Former patient of pavan Doherty Goyo Laguerre  has a past medical history of Seizures.    ADD-she has a long history of ADD ADHD.  It started in her early childhood and she saw her pediatrician at that time.  She also went through a mental health evaluation.  She has been on some medication at some dose or another over those years.  She is states her medication has really helped her with her attention focus concentration and staying on task.  She denies any untoward side effects such as loss of appetite unexplained weight loss frequent headaches palpitations tachycardia nervousness anxiousness or insomnia.        PHQ-2 Depression Screening  Little interest or pleasure in doing things?     Feeling down, depressed, or hopeless?     PHQ-2 Total Score     PHQ-9 Depression Screening  Little interest or pleasure in doing things?     Feeling down, depressed, or hopeless?     Trouble falling or staying asleep, or sleeping too much?     Feeling tired or having little energy?     Poor appetite or overeating?     Feeling bad about yourself - or that you are a failure or have let yourself or your family down?     Trouble concentrating on things, such as reading the newspaper or watching television?     Moving or speaking so slowly that other people could have noticed? Or the opposite - being so fidgety or restless that you have been moving around a lot more than usual?     Thoughts that you would be better off dead, or of hurting yourself in some way?     PHQ-9 Total Score     If you checked off any problems, how difficult have these problems made it for you to do your work, take care of things at home, or get along with other people?       No Known Allergies    Prior to Admission medications    Medication Sig Start Date End Date Taking? Authorizing Provider   methylphenidate (Concerta) 54 MG CR tablet Take 1 tablet by mouth Every Morning 5/14/25  Yes Russ  "Augustine Fowler MD   ALPRAZolam (XANAX) 1 MG tablet TAKE 1 TABLET BY MOUTH 1 HOUR PRIOR TO SURGERY 3/13/25 6/10/25 Yes Radha Mills MD   HYDROcodone-acetaminophen (NORCO) 5-325 MG per tablet Take 1 tablet by mouth Every 4 (Four) to 6 (Six) Hours As Needed for Pain. 3/13/25 6/10/25 Yes Radha Mills MD   ibuprofen (ADVIL,MOTRIN) 600 MG tablet Take 1 tablet by mouth every 6 (six) to 8 (eight) hours as needed for Pain. 3/13/25 6/10/25 Yes ProviderRadha MD        Patient Active Problem List   Diagnosis    Attention deficit disorder (ADD) without hyperactivity    Encounter for long-term (current) use of high-risk medication    Insomnia        Past Surgical History:   Procedure Laterality Date    FOOT SURGERY Right        Social History     Socioeconomic History    Marital status: Single   Tobacco Use    Smoking status: Never    Smokeless tobacco: Never   Vaping Use    Vaping status: Never Used   Substance and Sexual Activity    Alcohol use: Never    Drug use: Never    Sexual activity: Never       No family history on file.    Family history, surgical history, past medical history, Allergies and meds reviewed with patient today and updated in [employer] EMR.     ROS:  Review of Systems   Constitutional:  Negative for appetite change and unexpected weight loss.   Cardiovascular:  Negative for palpitations.   Neurological:  Negative for tremors and headache.   Psychiatric/Behavioral:  Negative for decreased concentration. The patient is not nervous/anxious.        OBJECTIVE:  Vitals:    06/10/25 1034   BP: 120/82   BP Location: Left arm   Patient Position: Sitting   Cuff Size: Adult   Pulse: 111   Temp: 98 °F (36.7 °C)   TempSrc: Temporal   SpO2: 98%   Weight: 51.3 kg (113 lb)   Height: 160 cm (63\")     No results found.   Body mass index is 20.02 kg/m².  No LMP recorded.    The ASCVD Risk score ([place] DK, et al., 2019) failed to calculate for the following reasons:    The 2019 ASCVD risk score is only " valid for ages 40 to 79     Physical Exam  Vitals and nursing note reviewed.   Constitutional:       General: She is not in acute distress.     Appearance: Normal appearance. She is normal weight.   HENT:      Head: Normocephalic.      Right Ear: Tympanic membrane, ear canal and external ear normal.      Left Ear: Tympanic membrane, ear canal and external ear normal.      Nose: Nose normal.      Mouth/Throat:      Mouth: Mucous membranes are moist.      Pharynx: Oropharynx is clear.   Eyes:      General: No scleral icterus.     Conjunctiva/sclera: Conjunctivae normal.      Pupils: Pupils are equal, round, and reactive to light.   Cardiovascular:      Rate and Rhythm: Normal rate and regular rhythm.      Pulses: Normal pulses.      Heart sounds: Normal heart sounds. No murmur heard.  Pulmonary:      Effort: Pulmonary effort is normal.      Breath sounds: Normal breath sounds. No wheezing, rhonchi or rales.   Musculoskeletal:      Cervical back: Neck supple. No rigidity or tenderness.   Lymphadenopathy:      Cervical: No cervical adenopathy.   Skin:     General: Skin is warm and dry.      Coloration: Skin is not jaundiced.      Findings: No rash.   Neurological:      General: No focal deficit present.      Mental Status: She is alert and oriented to person, place, and time.   Psychiatric:         Mood and Affect: Mood normal.         Thought Content: Thought content normal.         Judgment: Judgment normal.         Procedures    Office Visit on 06/10/2025   Component Date Value Ref Range Status    Amphetamine Screen, Urine 06/10/2025 Negative  Negative Final    AMP INTERNAL CONTROL 06/10/2025 Passed  Passed Final    Barbiturates Screen, Urine 06/10/2025 Negative  Negative Final    BARBITURATE INTERNAL CONTROL 06/10/2025 Passed  Passed Final    Buprenorphine, Screen, Urine 06/10/2025 Negative  Negative Final    BUPRENORPHINE INTERNAL CONTROL 06/10/2025 Passed  Passed Final    Benzodiazepine Screen, Urine 06/10/2025  Negative  Negative Final    BENZODIAZEPINE INTERNAL CONTROL 06/10/2025 Passed  Passed Final    Cocaine Screen, Urine 06/10/2025 Negative  Negative Final    COCAINE INTERNAL CONTROL 06/10/2025 Passed  Passed Final    MDMA (ECSTASY) 06/10/2025 Negative  Negative Final    MDMA (ECSTASY) INTERNAL CONTROL 06/10/2025 Passed  Passed Final    Methamphetamine, Ur 06/10/2025 Negative  Negative Final    METHAMPHETAMINE INTERNAL CONTROL 06/10/2025 Passed  Passed Final    Morphine/Opiates Screen, Urine 06/10/2025 Negative  Negative Final    MOR INTERNAL CONTROL 06/10/2025 Passed  Passed Final    Methadone Screen, Urine 06/10/2025 Negative  Negative Final    METHADONE INTERNAL CONTROL 06/10/2025 Passed  Passed Final    Oxycodone Screen, Urine 06/10/2025 Negative  Negative Final    OXYCODONE INTERNAL CONTROL 06/10/2025 Passed  Passed Final    Phencyclidine (PCP), Urine 06/10/2025 Negative  Negative Final    PHENCYCLIDINE INTERNAL CONTROL 06/10/2025 Passed  Passed Final    THC, Screen, Urine 06/10/2025 Negative  Negative Final    THC INTERNAL CONTROL 06/10/2025 Passed  Passed Final    Lot Number 06/10/2025 [id number]   Final    Expiration Date 06/10/2025 11,072,026   Final       ASSESSMENT/ PLAN:    Diagnoses and all orders for this visit:    1. Attention deficit disorder (ADD) without hyperactivity (Primary)  Assessment & Plan:  She is and has been doing well with her current medication and dosage.  Will update her UDS as well as her Rigo.    Orders:  -     POC Medline 12 Panel Urine Drug Screen  -     methylphenidate (Concerta) 54 MG CR tablet; Take 1 tablet by mouth Every Morning  Dispense: 30 tablet; Refill: 0    2. Encounter for long-term (current) use of high-risk medication  -     POC Medline 12 Panel Urine Drug Screen        BMI is within normal parameters. No other follow-up for BMI required.      Orders Placed Today:     New Medications Ordered This Visit   Medications    methylphenidate (Concerta) 54 MG CR tablet      Sig: Take 1 tablet by mouth Every Morning     Dispense:  30 tablet     Refill:  0        Management Plan:     An After Visit Summary was printed and given to the patient at discharge.    Follow-up: Return in about 3 months (around 9/10/2025) for Recheck.    Clayton Otero DO 6/10/2025 11:09 EDT  This note was electronically signed.

## 2025-07-14 DIAGNOSIS — F98.8 ATTENTION DEFICIT DISORDER (ADD) WITHOUT HYPERACTIVITY: ICD-10-CM

## 2025-07-15 RX ORDER — METHYLPHENIDATE HYDROCHLORIDE 54 MG/1
54 TABLET ORAL EVERY MORNING
Qty: 30 TABLET | Refills: 0 | Status: SHIPPED | OUTPATIENT
Start: 2025-07-15

## 2025-08-11 DIAGNOSIS — F98.8 ATTENTION DEFICIT DISORDER (ADD) WITHOUT HYPERACTIVITY: ICD-10-CM

## 2025-08-11 RX ORDER — METHYLPHENIDATE HYDROCHLORIDE 54 MG/1
54 TABLET ORAL EVERY MORNING
Qty: 30 TABLET | Refills: 0 | Status: SHIPPED | OUTPATIENT
Start: 2025-08-11